# Patient Record
Sex: FEMALE | Race: WHITE | NOT HISPANIC OR LATINO | Employment: UNEMPLOYED | URBAN - METROPOLITAN AREA
[De-identification: names, ages, dates, MRNs, and addresses within clinical notes are randomized per-mention and may not be internally consistent; named-entity substitution may affect disease eponyms.]

---

## 2023-07-03 LAB
ALANINE AMINOTRANSFERASE (SGPT) (U/L) IN SER/PLAS: 10 U/L (ref 7–45)
ALBUMIN (G/DL) IN SER/PLAS: 4 G/DL (ref 3.4–5)
ALKALINE PHOSPHATASE (U/L) IN SER/PLAS: 89 U/L (ref 33–136)
ANION GAP IN SER/PLAS: 9 MMOL/L (ref 10–20)
ASPARTATE AMINOTRANSFERASE (SGOT) (U/L) IN SER/PLAS: 12 U/L (ref 9–39)
BILIRUBIN TOTAL (MG/DL) IN SER/PLAS: 0.6 MG/DL (ref 0–1.2)
CALCIUM (MG/DL) IN SER/PLAS: 9.1 MG/DL (ref 8.6–10.3)
CARBON DIOXIDE, TOTAL (MMOL/L) IN SER/PLAS: 28 MMOL/L (ref 21–32)
CHLORIDE (MMOL/L) IN SER/PLAS: 107 MMOL/L (ref 98–107)
CHOLESTEROL (MG/DL) IN SER/PLAS: 111 MG/DL (ref 0–199)
CHOLESTEROL IN HDL (MG/DL) IN SER/PLAS: 48.3 MG/DL
CHOLESTEROL/HDL RATIO: 2.3
CREATININE (MG/DL) IN SER/PLAS: 1.14 MG/DL (ref 0.5–1.05)
ESTIMATED AVERAGE GLUCOSE FOR HBA1C: 126 MG/DL
GFR FEMALE: 54 ML/MIN/1.73M2
GLUCOSE (MG/DL) IN SER/PLAS: 90 MG/DL (ref 74–99)
HEMOGLOBIN A1C/HEMOGLOBIN TOTAL IN BLOOD: 6 %
LDL: 48 MG/DL (ref 0–99)
POTASSIUM (MMOL/L) IN SER/PLAS: 4.3 MMOL/L (ref 3.5–5.3)
PROTEIN TOTAL: 6.5 G/DL (ref 6.4–8.2)
SODIUM (MMOL/L) IN SER/PLAS: 140 MMOL/L (ref 136–145)
THYROTROPIN (MIU/L) IN SER/PLAS BY DETECTION LIMIT <= 0.05 MIU/L: 3.94 MIU/L (ref 0.44–3.98)
TRIGLYCERIDE (MG/DL) IN SER/PLAS: 74 MG/DL (ref 0–149)
UREA NITROGEN (MG/DL) IN SER/PLAS: 13 MG/DL (ref 6–23)
VLDL: 15 MG/DL (ref 0–40)

## 2023-09-12 PROBLEM — E78.5 HYPERLIPIDEMIA: Status: ACTIVE | Noted: 2023-09-12

## 2023-09-12 PROBLEM — R10.9 ABDOMINAL PAIN: Status: ACTIVE | Noted: 2023-09-12

## 2023-09-12 PROBLEM — M16.11 ARTHRITIS OF RIGHT HIP: Status: ACTIVE | Noted: 2023-09-12

## 2023-09-12 PROBLEM — Z96.642 PRESENCE OF LEFT ARTIFICIAL HIP JOINT: Status: ACTIVE | Noted: 2023-09-12

## 2023-09-12 PROBLEM — M25.50 MULTIPLE JOINT PAIN: Status: ACTIVE | Noted: 2023-09-12

## 2023-09-12 PROBLEM — N20.0 NEPHROLITHIASIS: Status: ACTIVE | Noted: 2023-09-12

## 2023-09-12 PROBLEM — E05.90 HYPERTHYROIDISM: Status: ACTIVE | Noted: 2023-09-12

## 2023-09-12 PROBLEM — M24.159 LABRAL TEAR OF HIP, DEGENERATIVE: Status: ACTIVE | Noted: 2023-09-12

## 2023-09-12 PROBLEM — E78.89 LIPIDS ABNORMAL: Status: ACTIVE | Noted: 2023-09-12

## 2023-09-12 PROBLEM — E11.9 DIABETES MELLITUS (MULTI): Status: ACTIVE | Noted: 2023-09-12

## 2023-09-12 PROBLEM — I25.10 CAD (CORONARY ARTERY DISEASE): Status: ACTIVE | Noted: 2023-09-12

## 2023-09-12 PROBLEM — N63.0 BREAST LUMP IN FEMALE: Status: ACTIVE | Noted: 2023-09-12

## 2023-09-12 PROBLEM — M12.9 ARTHROPATHY: Status: ACTIVE | Noted: 2023-09-12

## 2023-09-12 PROBLEM — M25.859 FEMORAL ACETABULAR IMPINGEMENT: Status: ACTIVE | Noted: 2023-09-12

## 2023-09-12 PROBLEM — E78.9 LIPID DISORDER: Status: ACTIVE | Noted: 2023-09-12

## 2023-09-12 PROBLEM — R53.83 FATIGUE: Status: ACTIVE | Noted: 2023-09-12

## 2023-09-12 PROBLEM — D64.9 ANEMIA: Status: ACTIVE | Noted: 2023-09-12

## 2023-09-12 PROBLEM — M25.569 PAIN IN KNEE JOINT: Status: ACTIVE | Noted: 2023-09-12

## 2023-09-12 PROBLEM — M25.50 ARTHRALGIA: Status: ACTIVE | Noted: 2023-09-12

## 2023-09-12 PROBLEM — M19.90 DJD (DEGENERATIVE JOINT DISEASE): Status: ACTIVE | Noted: 2023-09-12

## 2023-09-12 PROBLEM — Z96.641 PRESENCE OF ARTIFICIAL HIP, RIGHT: Status: ACTIVE | Noted: 2023-09-12

## 2023-09-12 PROBLEM — M16.12 ARTHRITIS OF LEFT HIP: Status: ACTIVE | Noted: 2023-09-12

## 2023-09-12 PROBLEM — I35.0 AORTIC VALVE STENOSIS: Status: ACTIVE | Noted: 2023-09-12

## 2023-09-12 PROBLEM — Z96.652 PRESENCE OF LEFT ARTIFICIAL KNEE JOINT: Status: ACTIVE | Noted: 2023-09-12

## 2023-09-12 PROBLEM — E55.9 VITAMIN D DEFICIENCY: Status: ACTIVE | Noted: 2023-09-12

## 2023-09-12 PROBLEM — N18.2 KIDNEY DISEASE, CHRONIC, STAGE II (MILD, EGFR 60+ ML/MIN): Status: ACTIVE | Noted: 2023-09-12

## 2023-09-12 RX ORDER — METFORMIN HYDROCHLORIDE 500 MG/1
500 TABLET, EXTENDED RELEASE ORAL DAILY
COMMUNITY

## 2023-09-12 RX ORDER — ATORVASTATIN CALCIUM 40 MG/1
40 TABLET, FILM COATED ORAL DAILY
COMMUNITY
End: 2023-11-02 | Stop reason: CLARIF

## 2023-09-12 RX ORDER — FERROUS SULFATE, DRIED 160(50) MG
1 TABLET, EXTENDED RELEASE ORAL 2 TIMES DAILY
COMMUNITY
Start: 2021-05-05

## 2023-09-12 RX ORDER — LISINOPRIL 2.5 MG/1
2.5 TABLET ORAL DAILY
COMMUNITY
End: 2023-11-02 | Stop reason: SINTOL

## 2023-09-12 RX ORDER — LEVOTHYROXINE SODIUM 75 UG/1
TABLET ORAL
COMMUNITY
Start: 2021-05-26

## 2023-09-12 RX ORDER — ASPIRIN 81 MG/1
1 TABLET ORAL DAILY
COMMUNITY
Start: 2018-07-09

## 2023-09-12 RX ORDER — GLIMEPIRIDE 1 MG/1
1 TABLET ORAL DAILY
COMMUNITY
Start: 2021-09-23

## 2023-09-12 RX ORDER — POLYETHYLENE GLYCOL 3350 17 G/17G
17 POWDER, FOR SOLUTION ORAL 2 TIMES DAILY PRN
COMMUNITY
Start: 2021-11-18 | End: 2023-11-02 | Stop reason: WASHOUT

## 2023-09-12 RX ORDER — ACETAMINOPHEN 325 MG/1
3 TABLET ORAL EVERY 6 HOURS
COMMUNITY
Start: 2021-11-18

## 2023-09-12 RX ORDER — UBIDECARENONE 75 MG
1 CAPSULE ORAL DAILY
COMMUNITY
Start: 2020-10-16

## 2023-09-12 RX ORDER — ONDANSETRON 4 MG/1
4 TABLET, FILM COATED ORAL EVERY 8 HOURS PRN
COMMUNITY
Start: 2021-11-18 | End: 2023-11-02 | Stop reason: WASHOUT

## 2023-09-12 RX ORDER — ATORVASTATIN CALCIUM 80 MG/1
1 TABLET, FILM COATED ORAL DAILY
COMMUNITY
Start: 2018-08-27

## 2023-09-12 RX ORDER — DOCUSATE SODIUM 100 MG/1
100 CAPSULE, LIQUID FILLED ORAL 2 TIMES DAILY
COMMUNITY
Start: 2021-11-18 | End: 2023-11-02 | Stop reason: WASHOUT

## 2023-09-12 RX ORDER — ALENDRONATE SODIUM 70 MG/1
70 TABLET ORAL
COMMUNITY
Start: 2021-05-05

## 2023-09-12 RX ORDER — KETOROLAC TROMETHAMINE 10 MG/1
10 TABLET, FILM COATED ORAL EVERY 8 HOURS
COMMUNITY
Start: 2021-11-18 | End: 2023-11-02 | Stop reason: WASHOUT

## 2023-10-04 ENCOUNTER — APPOINTMENT (OUTPATIENT)
Dept: RADIOLOGY | Facility: CLINIC | Age: 63
End: 2023-10-04
Payer: COMMERCIAL

## 2023-10-05 ENCOUNTER — ANCILLARY PROCEDURE (OUTPATIENT)
Dept: RADIOLOGY | Facility: CLINIC | Age: 63
End: 2023-10-05
Payer: COMMERCIAL

## 2023-10-05 DIAGNOSIS — Z12.31 ENCOUNTER FOR SCREENING MAMMOGRAM FOR MALIGNANT NEOPLASM OF BREAST: ICD-10-CM

## 2023-10-05 PROCEDURE — 77063 BREAST TOMOSYNTHESIS BI: CPT

## 2023-10-05 PROCEDURE — 77063 BREAST TOMOSYNTHESIS BI: CPT | Mod: LIO | Performed by: RADIOLOGY

## 2023-10-05 PROCEDURE — 77067 SCR MAMMO BI INCL CAD: CPT | Mod: LIO

## 2023-10-05 PROCEDURE — 77067 SCR MAMMO BI INCL CAD: CPT | Mod: LIO | Performed by: RADIOLOGY

## 2023-10-06 PROBLEM — M77.30 CALCANEAL SPUR OF FOOT: Status: ACTIVE | Noted: 2017-03-15

## 2023-10-06 PROBLEM — M15.9 POLYOSTEOARTHRITIS, UNSPECIFIED: Status: ACTIVE | Noted: 2023-02-14

## 2023-10-06 PROBLEM — N28.9 RENAL INSUFFICIENCY: Status: ACTIVE | Noted: 2023-10-06

## 2023-10-06 PROBLEM — M54.50 LOW BACK PAIN: Status: ACTIVE | Noted: 2023-02-14

## 2023-10-06 PROBLEM — E03.9 HYPOTHYROIDISM: Status: ACTIVE | Noted: 2023-07-13

## 2023-10-06 PROBLEM — K58.9 IRRITABLE BOWEL SYNDROME WITHOUT DIARRHEA: Status: ACTIVE | Noted: 2023-02-14

## 2023-10-06 PROBLEM — M77.50 CALCANEAL BURSITIS (HEEL): Status: ACTIVE | Noted: 2017-03-15

## 2023-10-06 PROBLEM — I10 HTN (HYPERTENSION): Status: ACTIVE | Noted: 2023-07-13

## 2023-10-06 PROBLEM — Z87.440 PERSONAL HISTORY OF URINARY (TRACT) INFECTIONS: Status: ACTIVE | Noted: 2023-02-14

## 2023-10-06 PROBLEM — H66.90 OTITIS MEDIA: Status: ACTIVE | Noted: 2023-02-14

## 2023-10-06 PROBLEM — E05.91 THYROTOXICOSIS WITH THYROTOXIC CRISIS: Status: ACTIVE | Noted: 2023-02-14

## 2023-10-06 PROBLEM — N18.9 ANEMIA IN CHRONIC KIDNEY DISEASE: Status: ACTIVE | Noted: 2023-02-14

## 2023-10-06 PROBLEM — D63.1 ANEMIA IN CHRONIC KIDNEY DISEASE: Status: ACTIVE | Noted: 2023-02-14

## 2023-10-06 PROBLEM — E11.9 TYPE 2 DIABETES MELLITUS, CONTROLLED (MULTI): Status: ACTIVE | Noted: 2023-07-13

## 2023-10-06 PROBLEM — M54.2 CERVICAL SPINE PAIN: Status: ACTIVE | Noted: 2023-10-06

## 2023-10-06 PROBLEM — N20.0 CALCULUS OF KIDNEY: Status: ACTIVE | Noted: 2023-02-14

## 2023-10-06 PROBLEM — M72.2 PLANTAR FASCIITIS: Status: ACTIVE | Noted: 2017-03-15

## 2023-10-06 PROBLEM — Z79.84 LONG TERM (CURRENT) USE OF ORAL HYPOGLYCEMIC DRUGS: Status: ACTIVE | Noted: 2023-02-14

## 2023-10-06 PROBLEM — Z79.82 LONG TERM (CURRENT) USE OF ASPIRIN: Status: ACTIVE | Noted: 2023-02-14

## 2023-10-06 PROBLEM — M16.9 OSTEOARTHROSIS OF HIP: Status: ACTIVE | Noted: 2023-10-06

## 2023-10-06 PROBLEM — Z47.1 AFTERCARE FOLLOWING JOINT REPLACEMENT SURGERY: Status: ACTIVE | Noted: 2023-02-14

## 2023-10-06 PROBLEM — M81.0 OSTEOPOROSIS: Status: ACTIVE | Noted: 2023-07-13

## 2023-10-06 PROBLEM — Z71.85 VACCINE COUNSELING: Status: ACTIVE | Noted: 2023-07-13

## 2023-10-16 ENCOUNTER — HOSPITAL ENCOUNTER (OUTPATIENT)
Dept: CARDIOLOGY | Facility: CLINIC | Age: 63
Discharge: HOME | End: 2023-10-16
Payer: COMMERCIAL

## 2023-10-16 DIAGNOSIS — I35.0 NONRHEUMATIC AORTIC (VALVE) STENOSIS: ICD-10-CM

## 2023-10-16 PROCEDURE — 93306 TTE W/DOPPLER COMPLETE: CPT

## 2023-10-16 PROCEDURE — 93306 TTE W/DOPPLER COMPLETE: CPT | Performed by: INTERNAL MEDICINE

## 2023-10-18 LAB — EJECTION FRACTION APICAL 4 CHAMBER: 63.8

## 2023-10-30 ENCOUNTER — LAB (OUTPATIENT)
Dept: LAB | Facility: LAB | Age: 63
End: 2023-10-30
Payer: COMMERCIAL

## 2023-10-30 DIAGNOSIS — E11.9 TYPE 2 DIABETES MELLITUS WITHOUT COMPLICATIONS (MULTI): ICD-10-CM

## 2023-10-30 DIAGNOSIS — E78.5 HYPERLIPIDEMIA, UNSPECIFIED: Primary | ICD-10-CM

## 2023-10-30 DIAGNOSIS — M81.0 AGE-RELATED OSTEOPOROSIS WITHOUT CURRENT PATHOLOGICAL FRACTURE: ICD-10-CM

## 2023-10-30 LAB
ALBUMIN SERPL BCP-MCNC: 4.7 G/DL (ref 3.4–5)
ALP SERPL-CCNC: 75 U/L (ref 33–136)
ALT SERPL W P-5'-P-CCNC: 8 U/L (ref 7–45)
ANION GAP SERPL CALC-SCNC: 13 MMOL/L (ref 10–20)
AST SERPL W P-5'-P-CCNC: 12 U/L (ref 9–39)
BASOPHILS # BLD AUTO: 0.03 X10*3/UL (ref 0–0.1)
BASOPHILS NFR BLD AUTO: 0.6 %
BILIRUB SERPL-MCNC: 0.8 MG/DL (ref 0–1.2)
BUN SERPL-MCNC: 23 MG/DL (ref 6–23)
CALCIUM SERPL-MCNC: 9.6 MG/DL (ref 8.6–10.3)
CHLORIDE SERPL-SCNC: 105 MMOL/L (ref 98–107)
CHOLEST SERPL-MCNC: 148 MG/DL (ref 0–199)
CHOLESTEROL/HDL RATIO: 2.5
CO2 SERPL-SCNC: 26 MMOL/L (ref 21–32)
CREAT SERPL-MCNC: 1.23 MG/DL (ref 0.5–1.05)
EOSINOPHIL # BLD AUTO: 0.1 X10*3/UL (ref 0–0.7)
EOSINOPHIL NFR BLD AUTO: 1.9 %
ERYTHROCYTE [DISTWIDTH] IN BLOOD BY AUTOMATED COUNT: 13.8 % (ref 11.5–14.5)
GFR SERPL CREATININE-BSD FRML MDRD: 49 ML/MIN/1.73M*2
GLUCOSE SERPL-MCNC: 93 MG/DL (ref 74–99)
HCT VFR BLD AUTO: 38.3 % (ref 36–46)
HDLC SERPL-MCNC: 60.2 MG/DL
HGB BLD-MCNC: 12.9 G/DL (ref 12–16)
IMM GRANULOCYTES # BLD AUTO: 0.02 X10*3/UL (ref 0–0.7)
IMM GRANULOCYTES NFR BLD AUTO: 0.4 % (ref 0–0.9)
LDLC SERPL CALC-MCNC: 68 MG/DL
LYMPHOCYTES # BLD AUTO: 1.48 X10*3/UL (ref 1.2–4.8)
LYMPHOCYTES NFR BLD AUTO: 28.2 %
MCH RBC QN AUTO: 33.5 PG (ref 26–34)
MCHC RBC AUTO-ENTMCNC: 33.7 G/DL (ref 32–36)
MCV RBC AUTO: 100 FL (ref 80–100)
MONOCYTES # BLD AUTO: 0.48 X10*3/UL (ref 0.1–1)
MONOCYTES NFR BLD AUTO: 9.1 %
NEUTROPHILS # BLD AUTO: 3.14 X10*3/UL (ref 1.2–7.7)
NEUTROPHILS NFR BLD AUTO: 59.8 %
NON HDL CHOLESTEROL: 88 MG/DL (ref 0–149)
NRBC BLD-RTO: 0 /100 WBCS (ref 0–0)
PLATELET # BLD AUTO: 267 X10*3/UL (ref 150–450)
PMV BLD AUTO: 10 FL (ref 7.5–11.5)
POTASSIUM SERPL-SCNC: 4.5 MMOL/L (ref 3.5–5.3)
PROT SERPL-MCNC: 7 G/DL (ref 6.4–8.2)
RBC # BLD AUTO: 3.85 X10*6/UL (ref 4–5.2)
SODIUM SERPL-SCNC: 139 MMOL/L (ref 136–145)
TRIGL SERPL-MCNC: 97 MG/DL (ref 0–149)
TSH SERPL-ACNC: 2.21 MIU/L (ref 0.44–3.98)
VLDL: 19 MG/DL (ref 0–40)
WBC # BLD AUTO: 5.3 X10*3/UL (ref 4.4–11.3)

## 2023-10-30 PROCEDURE — 80053 COMPREHEN METABOLIC PANEL: CPT

## 2023-10-30 PROCEDURE — 36415 COLL VENOUS BLD VENIPUNCTURE: CPT

## 2023-10-30 PROCEDURE — 80061 LIPID PANEL: CPT

## 2023-10-30 PROCEDURE — 85025 COMPLETE CBC W/AUTO DIFF WBC: CPT

## 2023-10-30 PROCEDURE — 84443 ASSAY THYROID STIM HORMONE: CPT

## 2023-10-30 PROCEDURE — 83036 HEMOGLOBIN GLYCOSYLATED A1C: CPT

## 2023-10-31 LAB
EST. AVERAGE GLUCOSE BLD GHB EST-MCNC: 120 MG/DL
HBA1C MFR BLD: 5.8 %

## 2023-11-01 PROBLEM — I35.0 AORTIC VALVE STENOSIS: Chronic | Status: ACTIVE | Noted: 2023-09-12

## 2023-11-01 PROBLEM — I25.10 CAD (CORONARY ARTERY DISEASE): Chronic | Status: ACTIVE | Noted: 2023-09-12

## 2023-11-01 PROBLEM — E78.5 HYPERLIPIDEMIA: Chronic | Status: ACTIVE | Noted: 2023-09-12

## 2023-11-02 ENCOUNTER — OFFICE VISIT (OUTPATIENT)
Dept: CARDIOLOGY | Facility: CLINIC | Age: 63
End: 2023-11-02
Payer: COMMERCIAL

## 2023-11-02 VITALS
BODY MASS INDEX: 26.79 KG/M2 | HEART RATE: 56 BPM | OXYGEN SATURATION: 99 % | WEIGHT: 161 LBS | SYSTOLIC BLOOD PRESSURE: 128 MMHG | DIASTOLIC BLOOD PRESSURE: 84 MMHG

## 2023-11-02 DIAGNOSIS — I10 PRIMARY HYPERTENSION: Chronic | ICD-10-CM

## 2023-11-02 DIAGNOSIS — I25.10 CORONARY ARTERY DISEASE INVOLVING NATIVE CORONARY ARTERY OF NATIVE HEART WITHOUT ANGINA PECTORIS: Chronic | ICD-10-CM

## 2023-11-02 DIAGNOSIS — E78.2 MIXED HYPERLIPIDEMIA: Chronic | ICD-10-CM

## 2023-11-02 DIAGNOSIS — I35.0 NONRHEUMATIC AORTIC VALVE STENOSIS: Primary | Chronic | ICD-10-CM

## 2023-11-02 PROBLEM — N20.0 CALCULUS OF KIDNEY: Status: RESOLVED | Noted: 2023-02-14 | Resolved: 2023-11-02

## 2023-11-02 PROBLEM — N63.0 BREAST LUMP IN FEMALE: Status: RESOLVED | Noted: 2023-09-12 | Resolved: 2023-11-02

## 2023-11-02 PROBLEM — M16.12 ARTHRITIS OF LEFT HIP: Status: RESOLVED | Noted: 2023-09-12 | Resolved: 2023-11-02

## 2023-11-02 PROBLEM — Z79.84 LONG TERM (CURRENT) USE OF ORAL HYPOGLYCEMIC DRUGS: Status: RESOLVED | Noted: 2023-02-14 | Resolved: 2023-11-02

## 2023-11-02 PROBLEM — E11.9 TYPE 2 DIABETES MELLITUS, CONTROLLED (MULTI): Chronic | Status: ACTIVE | Noted: 2023-07-13

## 2023-11-02 PROBLEM — Z87.440 PERSONAL HISTORY OF URINARY (TRACT) INFECTIONS: Status: RESOLVED | Noted: 2023-02-14 | Resolved: 2023-11-02

## 2023-11-02 PROBLEM — M25.50 MULTIPLE JOINT PAIN: Status: RESOLVED | Noted: 2023-09-12 | Resolved: 2023-11-02

## 2023-11-02 PROBLEM — M25.569 PAIN IN KNEE JOINT: Status: RESOLVED | Noted: 2023-09-12 | Resolved: 2023-11-02

## 2023-11-02 PROBLEM — M72.2 PLANTAR FASCIITIS: Status: RESOLVED | Noted: 2017-03-15 | Resolved: 2023-11-02

## 2023-11-02 PROBLEM — Z71.85 VACCINE COUNSELING: Status: RESOLVED | Noted: 2023-07-13 | Resolved: 2023-11-02

## 2023-11-02 PROBLEM — Z47.1 AFTERCARE FOLLOWING JOINT REPLACEMENT SURGERY: Status: RESOLVED | Noted: 2023-02-14 | Resolved: 2023-11-02

## 2023-11-02 PROBLEM — M15.9 POLYOSTEOARTHRITIS, UNSPECIFIED: Status: RESOLVED | Noted: 2023-02-14 | Resolved: 2023-11-02

## 2023-11-02 PROBLEM — H66.90 OTITIS MEDIA: Status: RESOLVED | Noted: 2023-02-14 | Resolved: 2023-11-02

## 2023-11-02 PROBLEM — M16.11 ARTHRITIS OF RIGHT HIP: Status: RESOLVED | Noted: 2023-09-12 | Resolved: 2023-11-02

## 2023-11-02 PROBLEM — M25.50 ARTHRALGIA: Status: RESOLVED | Noted: 2023-09-12 | Resolved: 2023-11-02

## 2023-11-02 PROBLEM — D63.1 ANEMIA IN CHRONIC KIDNEY DISEASE: Status: RESOLVED | Noted: 2023-02-14 | Resolved: 2023-11-02

## 2023-11-02 PROBLEM — M25.859 FEMORAL ACETABULAR IMPINGEMENT: Status: RESOLVED | Noted: 2023-09-12 | Resolved: 2023-11-02

## 2023-11-02 PROBLEM — Z79.82 LONG TERM (CURRENT) USE OF ASPIRIN: Status: RESOLVED | Noted: 2023-02-14 | Resolved: 2023-11-02

## 2023-11-02 PROBLEM — Z96.641 PRESENCE OF ARTIFICIAL HIP, RIGHT: Status: RESOLVED | Noted: 2023-09-12 | Resolved: 2023-11-02

## 2023-11-02 PROBLEM — M81.0 OSTEOPOROSIS: Status: RESOLVED | Noted: 2023-07-13 | Resolved: 2023-11-02

## 2023-11-02 PROBLEM — N18.9 ANEMIA IN CHRONIC KIDNEY DISEASE: Status: RESOLVED | Noted: 2023-02-14 | Resolved: 2023-11-02

## 2023-11-02 PROBLEM — M54.50 LOW BACK PAIN: Status: RESOLVED | Noted: 2023-02-14 | Resolved: 2023-11-02

## 2023-11-02 PROBLEM — M12.9 ARTHROPATHY: Status: RESOLVED | Noted: 2023-09-12 | Resolved: 2023-11-02

## 2023-11-02 PROBLEM — M16.9 OSTEOARTHROSIS OF HIP: Status: RESOLVED | Noted: 2023-10-06 | Resolved: 2023-11-02

## 2023-11-02 PROBLEM — M77.50 CALCANEAL BURSITIS (HEEL): Status: RESOLVED | Noted: 2017-03-15 | Resolved: 2023-11-02

## 2023-11-02 PROCEDURE — 93000 ELECTROCARDIOGRAM COMPLETE: CPT | Performed by: INTERNAL MEDICINE

## 2023-11-02 PROCEDURE — 99214 OFFICE O/P EST MOD 30 MIN: CPT | Performed by: INTERNAL MEDICINE

## 2023-11-02 PROCEDURE — 3048F LDL-C <100 MG/DL: CPT | Performed by: INTERNAL MEDICINE

## 2023-11-02 PROCEDURE — 3079F DIAST BP 80-89 MM HG: CPT | Performed by: INTERNAL MEDICINE

## 2023-11-02 PROCEDURE — 3044F HG A1C LEVEL LT 7.0%: CPT | Performed by: INTERNAL MEDICINE

## 2023-11-02 PROCEDURE — 1036F TOBACCO NON-USER: CPT | Performed by: INTERNAL MEDICINE

## 2023-11-02 PROCEDURE — 3074F SYST BP LT 130 MM HG: CPT | Performed by: INTERNAL MEDICINE

## 2023-11-02 RX ORDER — EZETIMIBE 10 MG/1
10 TABLET ORAL DAILY
COMMUNITY
Start: 2023-02-04

## 2023-11-02 NOTE — PROGRESS NOTES
Referred by No ref. provider found    HPI Had her 2nd hip done. Working out in the gym and walking 3 miles/day. Off metformin.. No CP/SOB. Feeling well. Wt down 17#, more active, feeling better.     Past Medical History:  Problem List Items Addressed This Visit    None       Past Medical History:   Diagnosis Date    Aortic valve stenosis 09/12/2023    Mild-mod    CAD (coronary artery disease) 09/12/2023    Elevated CAC 67 Agatston units    Cervicalgia 12/08/2013    Cervicalgia    Encounter for general adult medical examination without abnormal findings 12/08/2013    Physical exam, routine    Heart disease, unspecified     Heart problem    Hyperlipidemia 09/12/2023    Dr. Agustin    Hypothyroidism, unspecified 12/08/2013    Hypothyroidism    Low back pain, unspecified 12/08/2013    Lumbago    Noninfective gastroenteritis and colitis, unspecified 10/30/2014    Chronic diarrhea    Other abnormalities of breathing 12/08/2013    Difficulty breathing    Other conditions influencing health status 12/08/2013    Osteoarthritis Of Multiple Sites    Other conditions influencing health status 12/08/2013    Ruptured Endometrioma    Other ill-defined heart diseases     Familial heart disease    Otitis media, unspecified, unspecified ear 12/08/2013    Otitis media    Personal history of other diseases of the digestive system 10/30/2014    History of irritable bowel syndrome    Prediabetes     Prediabetes    Unspecified condition associated with female genital organs and menstrual cycle 12/08/2013    Genital symptoms, female             Past Surgical History:  She has a past surgical history that includes Other surgical history (04/04/2022); Other surgical history (04/04/2022); and Cholecystectomy (10/30/2014).      Social History:  She has no history on file for tobacco use, alcohol use, and drug use.    Family History:  Family History   Problem Relation Name Age of Onset    Heart failure Mother      Coronary artery disease Mother       Diabetes Mother      Hyperlipidemia Mother      Hypertension Mother      Heart attack Mother      Hypertension Father      Other (parkinson disease) Father      Diabetes type II Father      Other (cardiac disorder) Other      Diabetes Other      Hypertension Other      Breast cancer Father's Sister          Allergies:  Statins-hmg-coa reductase inhibitors, Erythromycin base, Lisinopril, and Metformin    Outpatient Medications:  Current Outpatient Medications   Medication Instructions    acetaminophen (Tylenol) 325 mg tablet 3 tablets, oral, Every 6 hours, CONTINUE ROUTINELY AROUND THE CLOCK FOR NEXT 72 HOURS THEN DECREASE TO AS NEEDED FOR MILD PAIN OR TEMPERATURE    alendronate (FOSAMAX) 70 mg, oral, every Monday    aspirin 81 mg EC tablet 1 tablet, oral, Daily    atorvastatin (Lipitor) 80 mg tablet 1 tablet, oral, Daily    atorvastatin (LIPITOR) 40 mg, oral, Daily    calcium carbonate-vitamin D3 500 mg-5 mcg (200 unit) tablet oral    cyanocobalamin (Vitamin B-12) 500 mcg tablet 1 tablet, oral, Daily    docusate sodium (COLACE) 100 mg, oral, 2 times daily    glimepiride (Amaryl) 1 mg tablet 1 tablet, oral, Daily    ketorolac (TORADOL) 10 mg, oral, Every 8 hours    levothyroxine (Synthroid, Levoxyl) 75 mcg tablet oral    lisinopril 2.5 mg, oral, Daily    metFORMIN XR (GLUCOPHAGE-XR) 500 mg, oral, Daily    ondansetron (ZOFRAN) 4 mg, oral, Every 8 hours PRN    polyethylene glycol (GLYCOLAX, MIRALAX) 17 g, oral, 2 times daily PRN,  *TAKE DOSE IF YOU DO NOT HAVE A BOWEL MOVEMENT WITHIN 72 HOURS OF DISCHARGE<BR>        Last Recorded Vitals:  There were no vitals filed for this visit.    Physical Exam    Physical  Patient is alert and oriented x3.  HEENT is unremarkable mucous members are moist  Neck no JVP no bruits upstrokes are full no thyromegaly  Lungs are clear bilaterally.  No wheezing crackles or rales  Heart regular rhythm normal S1-S2 there is no S3 no murmurs are heard.  Abdomen is soft vessels are positive  nontender nondistended no organomegaly no pulsatile masses  Extremities have no edema.  Distal pulses present palpable.  Neuro is grossly nonfocal  Skin has no rashes     Last Labs:  CBC -  Lab Results   Component Value Date    WBC 5.3 10/30/2023    HGB 12.9 10/30/2023    HCT 38.3 10/30/2023     10/30/2023     10/30/2023       CMP -  Lab Results   Component Value Date    CALCIUM 9.6 10/30/2023    PROT 7.0 10/30/2023    ALBUMIN 4.7 10/30/2023    AST 12 10/30/2023    ALT 8 10/30/2023    ALKPHOS 75 10/30/2023    BILITOT 0.8 10/30/2023       LIPID PANEL -   Lab Results   Component Value Date    CHOL 148 10/30/2023    HDL 60.2 10/30/2023    CHHDL 2.5 10/30/2023    VLDL 19 10/30/2023    TRIG 97 10/30/2023    NHDL 88 10/30/2023       RENAL FUNCTION PANEL -   Lab Results   Component Value Date    K 4.5 10/30/2023       Lab Results   Component Value Date    HGBA1C 5.8 (H) 10/30/2023     Procedure  Echo 9/23/23 EF 55-60%, Mild A S    AST 11/21/22 NL EF 65%    ECHO [04/12/2021]: EF 55-60%. SD = pseudonormal filling pattern. Mild AS (AV max 3 M/sec, peak/mean gradient 35/21 mmHg, JOEL 1.6 cm2).      ECHO (10/01/2018): Normal LVF 55-60% EF. NO PE. Mild AS w/ AV max 3m/s     CAC [08/2018] = 67 (left main and LAD)    Assessment/Plan   1. CAD. She has an elevated calcium score of 65 units. She has no symptoms of chest pain or pressure. Rega nuc NL 11/2022. Doing great. Walking 3 miles/day now after hip surg. Wt down. LDL better.     2. Aortic stenosis. This has been mild since 2018. Repeat echo reveals mild A S     3. Hyperlipidemia. Her LDL last year was 83. It is now gone up to 108. This correlates with the 15 pound weight gain that she has had over the course of the year. My hope is after her hip surgery she is able to do more activity to get the weight down and to get her cholesterol down. 10./30/23 LDL 68, HDL 60. Will be followed with Dr. Agustin    RTC 1 year  EKG today       Darin Arriaga MD     Instructions and  follow up

## 2023-11-02 NOTE — PATIENT INSTRUCTIONS
1. CAD. She has an elevated calcium score of 65 units. She has no symptoms of chest pain or pressure. Rega nuc NL 11/2022. Doing great. Walking 3 miles/day now after hip surg. Wt down. LDL better.     2. Aortic stenosis. This has been mild since 2018. Repeat echo reveals mild A S     3. Hyperlipidemia. Her LDL last year was 83. It is now gone up to 108. This correlates with the 15 pound weight gain that she has had over the course of the year. My hope is after her hip surgery she is able to do more activity to get the weight down and to get her cholesterol down. 10./30/23 LDL 68, HDL 60. Will be followed with Dr. Agustin    RTC 1 year  EKG today

## 2024-01-02 ENCOUNTER — LAB (OUTPATIENT)
Dept: LAB | Facility: LAB | Age: 64
End: 2024-01-02
Payer: COMMERCIAL

## 2024-01-02 DIAGNOSIS — M81.0 AGE-RELATED OSTEOPOROSIS WITHOUT CURRENT PATHOLOGICAL FRACTURE: Primary | ICD-10-CM

## 2024-01-02 LAB
ALBUMIN SERPL BCP-MCNC: 4.5 G/DL (ref 3.4–5)
ALP SERPL-CCNC: 83 U/L (ref 33–136)
ALT SERPL W P-5'-P-CCNC: 9 U/L (ref 7–45)
ANION GAP SERPL CALC-SCNC: 12 MMOL/L (ref 10–20)
AST SERPL W P-5'-P-CCNC: 13 U/L (ref 9–39)
BILIRUB SERPL-MCNC: 0.6 MG/DL (ref 0–1.2)
BUN SERPL-MCNC: 28 MG/DL (ref 6–23)
CALCIUM SERPL-MCNC: 9.4 MG/DL (ref 8.6–10.3)
CHLORIDE SERPL-SCNC: 106 MMOL/L (ref 98–107)
CO2 SERPL-SCNC: 26 MMOL/L (ref 21–32)
CREAT SERPL-MCNC: 1.29 MG/DL (ref 0.5–1.05)
GFR SERPL CREATININE-BSD FRML MDRD: 46 ML/MIN/1.73M*2
GLUCOSE SERPL-MCNC: 81 MG/DL (ref 74–99)
POTASSIUM SERPL-SCNC: 4.6 MMOL/L (ref 3.5–5.3)
PROT SERPL-MCNC: 6.8 G/DL (ref 6.4–8.2)
SODIUM SERPL-SCNC: 139 MMOL/L (ref 136–145)

## 2024-01-02 PROCEDURE — 36415 COLL VENOUS BLD VENIPUNCTURE: CPT

## 2024-01-02 PROCEDURE — 80053 COMPREHEN METABOLIC PANEL: CPT

## 2024-01-09 PROBLEM — M81.8 OTHER OSTEOPOROSIS WITHOUT CURRENT PATHOLOGICAL FRACTURE: Status: ACTIVE | Noted: 2024-01-09

## 2024-01-09 RX ORDER — ALBUTEROL SULFATE 0.83 MG/ML
3 SOLUTION RESPIRATORY (INHALATION) AS NEEDED
Status: CANCELLED | OUTPATIENT
Start: 2024-01-10

## 2024-01-09 RX ORDER — FAMOTIDINE 10 MG/ML
20 INJECTION INTRAVENOUS ONCE AS NEEDED
Status: CANCELLED | OUTPATIENT
Start: 2024-01-10

## 2024-01-09 RX ORDER — DIPHENHYDRAMINE HYDROCHLORIDE 50 MG/ML
50 INJECTION INTRAMUSCULAR; INTRAVENOUS AS NEEDED
Status: CANCELLED | OUTPATIENT
Start: 2024-01-10

## 2024-01-09 RX ORDER — EPINEPHRINE 0.3 MG/.3ML
0.3 INJECTION SUBCUTANEOUS EVERY 5 MIN PRN
Status: CANCELLED | OUTPATIENT
Start: 2024-01-10

## 2024-01-10 ENCOUNTER — INFUSION (OUTPATIENT)
Dept: INFUSION THERAPY | Facility: CLINIC | Age: 64
End: 2024-01-10
Payer: COMMERCIAL

## 2024-01-10 VITALS
RESPIRATION RATE: 16 BRPM | TEMPERATURE: 98.6 F | DIASTOLIC BLOOD PRESSURE: 85 MMHG | HEART RATE: 64 BPM | OXYGEN SATURATION: 99 % | SYSTOLIC BLOOD PRESSURE: 138 MMHG

## 2024-01-10 DIAGNOSIS — M81.8 OTHER OSTEOPOROSIS WITHOUT CURRENT PATHOLOGICAL FRACTURE: Primary | ICD-10-CM

## 2024-01-10 PROCEDURE — 2500000004 HC RX 250 GENERAL PHARMACY W/ HCPCS (ALT 636 FOR OP/ED): Mod: JZ | Performed by: INTERNAL MEDICINE

## 2024-01-10 PROCEDURE — 96372 THER/PROPH/DIAG INJ SC/IM: CPT | Performed by: INTERNAL MEDICINE

## 2024-01-10 RX ORDER — FAMOTIDINE 10 MG/ML
20 INJECTION INTRAVENOUS ONCE AS NEEDED
OUTPATIENT
Start: 2024-07-08

## 2024-01-10 RX ORDER — EPINEPHRINE 0.3 MG/.3ML
0.3 INJECTION SUBCUTANEOUS EVERY 5 MIN PRN
OUTPATIENT
Start: 2024-07-08

## 2024-01-10 RX ORDER — DIPHENHYDRAMINE HYDROCHLORIDE 50 MG/ML
50 INJECTION INTRAMUSCULAR; INTRAVENOUS AS NEEDED
OUTPATIENT
Start: 2024-07-08

## 2024-01-10 RX ORDER — ALBUTEROL SULFATE 0.83 MG/ML
3 SOLUTION RESPIRATORY (INHALATION) AS NEEDED
OUTPATIENT
Start: 2024-07-08

## 2024-01-10 RX ADMIN — DENOSUMAB 60 MG: 60 INJECTION SUBCUTANEOUS at 15:15

## 2024-01-10 ASSESSMENT — PATIENT HEALTH QUESTIONNAIRE - PHQ9
2. FEELING DOWN, DEPRESSED OR HOPELESS: NOT AT ALL
SUM OF ALL RESPONSES TO PHQ9 QUESTIONS 1 AND 2: 0
1. LITTLE INTEREST OR PLEASURE IN DOING THINGS: NOT AT ALL

## 2024-01-10 ASSESSMENT — COLUMBIA-SUICIDE SEVERITY RATING SCALE - C-SSRS
2. HAVE YOU ACTUALLY HAD ANY THOUGHTS OF KILLING YOURSELF?: NO
1. IN THE PAST MONTH, HAVE YOU WISHED YOU WERE DEAD OR WISHED YOU COULD GO TO SLEEP AND NOT WAKE UP?: NO
6. HAVE YOU EVER DONE ANYTHING, STARTED TO DO ANYTHING, OR PREPARED TO DO ANYTHING TO END YOUR LIFE?: NO

## 2024-01-10 ASSESSMENT — ENCOUNTER SYMPTOMS
LOSS OF SENSATION IN FEET: 0
OCCASIONAL FEELINGS OF UNSTEADINESS: 0
DEPRESSION: 0

## 2024-01-10 NOTE — PROGRESS NOTES
Pt here for prolia injection, calcium 9.4 on 1/2/24. Pt does not have any dental procedures in next 4 weeks. No other contraindications at this time.

## 2024-02-19 ENCOUNTER — TELEPHONE (OUTPATIENT)
Dept: GASTROENTEROLOGY | Facility: CLINIC | Age: 64
End: 2024-02-19
Payer: COMMERCIAL

## 2024-02-19 DIAGNOSIS — Z12.11 COLON CANCER SCREENING: Primary | ICD-10-CM

## 2024-02-20 ENCOUNTER — APPOINTMENT (OUTPATIENT)
Dept: GASTROENTEROLOGY | Facility: CLINIC | Age: 64
End: 2024-02-20
Payer: COMMERCIAL

## 2024-02-20 RX ORDER — SODIUM, POTASSIUM,MAG SULFATES 17.5-3.13G
SOLUTION, RECONSTITUTED, ORAL ORAL
Qty: 354 ML | Refills: 0 | Status: SHIPPED | OUTPATIENT
Start: 2024-02-20 | End: 2024-03-14 | Stop reason: ALTCHOICE

## 2024-03-01 DIAGNOSIS — Z12.11 COLON CANCER SCREENING: Primary | ICD-10-CM

## 2024-03-01 RX ORDER — POLYETHYLENE GLYCOL 3350, SODIUM SULFATE ANHYDROUS, SODIUM BICARBONATE, SODIUM CHLORIDE, POTASSIUM CHLORIDE 236; 22.74; 6.74; 5.86; 2.97 G/4L; G/4L; G/4L; G/4L; G/4L
4000 POWDER, FOR SOLUTION ORAL ONCE
Qty: 4000 ML | Refills: 0 | Status: SHIPPED | OUTPATIENT
Start: 2024-03-01 | End: 2024-03-01

## 2024-03-14 ENCOUNTER — ANESTHESIA (OUTPATIENT)
Dept: GASTROENTEROLOGY | Facility: HOSPITAL | Age: 64
End: 2024-03-14
Payer: COMMERCIAL

## 2024-03-14 ENCOUNTER — ANESTHESIA EVENT (OUTPATIENT)
Dept: GASTROENTEROLOGY | Facility: HOSPITAL | Age: 64
End: 2024-03-14
Payer: COMMERCIAL

## 2024-03-14 ENCOUNTER — HOSPITAL ENCOUNTER (OUTPATIENT)
Dept: GASTROENTEROLOGY | Facility: HOSPITAL | Age: 64
Discharge: HOME | End: 2024-03-14
Payer: COMMERCIAL

## 2024-03-14 VITALS
HEART RATE: 65 BPM | TEMPERATURE: 96.8 F | DIASTOLIC BLOOD PRESSURE: 71 MMHG | OXYGEN SATURATION: 99 % | SYSTOLIC BLOOD PRESSURE: 136 MMHG | WEIGHT: 158 LBS | RESPIRATION RATE: 16 BRPM | BODY MASS INDEX: 26.29 KG/M2

## 2024-03-14 DIAGNOSIS — Z12.11 SPECIAL SCREENING FOR MALIGNANT NEOPLASMS, COLON: ICD-10-CM

## 2024-03-14 LAB — GLUCOSE BLD MANUAL STRIP-MCNC: 89 MG/DL (ref 74–99)

## 2024-03-14 PROCEDURE — 82947 ASSAY GLUCOSE BLOOD QUANT: CPT

## 2024-03-14 PROCEDURE — 2500000005 HC RX 250 GENERAL PHARMACY W/O HCPCS: Performed by: NURSE ANESTHETIST, CERTIFIED REGISTERED

## 2024-03-14 PROCEDURE — 88305 TISSUE EXAM BY PATHOLOGIST: CPT

## 2024-03-14 PROCEDURE — 2500000004 HC RX 250 GENERAL PHARMACY W/ HCPCS (ALT 636 FOR OP/ED): Performed by: NURSE ANESTHETIST, CERTIFIED REGISTERED

## 2024-03-14 PROCEDURE — A45378 PR COLONOSCOPY,DIAGNOSTIC: Performed by: ANESTHESIOLOGY

## 2024-03-14 PROCEDURE — 7100000010 HC PHASE TWO TIME - EACH INCREMENTAL 1 MINUTE

## 2024-03-14 PROCEDURE — 3700000002 HC GENERAL ANESTHESIA TIME - EACH INCREMENTAL 1 MINUTE

## 2024-03-14 PROCEDURE — 45385 COLONOSCOPY W/LESION REMOVAL: CPT | Performed by: STUDENT IN AN ORGANIZED HEALTH CARE EDUCATION/TRAINING PROGRAM

## 2024-03-14 PROCEDURE — A45378 PR COLONOSCOPY,DIAGNOSTIC: Performed by: NURSE ANESTHETIST, CERTIFIED REGISTERED

## 2024-03-14 PROCEDURE — 2500000004 HC RX 250 GENERAL PHARMACY W/ HCPCS (ALT 636 FOR OP/ED): Performed by: STUDENT IN AN ORGANIZED HEALTH CARE EDUCATION/TRAINING PROGRAM

## 2024-03-14 PROCEDURE — 7100000009 HC PHASE TWO TIME - INITIAL BASE CHARGE

## 2024-03-14 PROCEDURE — 3700000001 HC GENERAL ANESTHESIA TIME - INITIAL BASE CHARGE

## 2024-03-14 PROCEDURE — 88305 TISSUE EXAM BY PATHOLOGIST: CPT | Mod: TC,PARLAB | Performed by: STUDENT IN AN ORGANIZED HEALTH CARE EDUCATION/TRAINING PROGRAM

## 2024-03-14 RX ORDER — ONDANSETRON HYDROCHLORIDE 2 MG/ML
4 INJECTION, SOLUTION INTRAVENOUS ONCE AS NEEDED
Status: DISCONTINUED | OUTPATIENT
Start: 2024-03-14 | End: 2024-03-15 | Stop reason: HOSPADM

## 2024-03-14 RX ORDER — PROPOFOL 10 MG/ML
INJECTION, EMULSION INTRAVENOUS AS NEEDED
Status: DISCONTINUED | OUTPATIENT
Start: 2024-03-14 | End: 2024-03-14

## 2024-03-14 RX ORDER — SODIUM CHLORIDE, SODIUM LACTATE, POTASSIUM CHLORIDE, CALCIUM CHLORIDE 600; 310; 30; 20 MG/100ML; MG/100ML; MG/100ML; MG/100ML
20 INJECTION, SOLUTION INTRAVENOUS CONTINUOUS
Status: DISCONTINUED | OUTPATIENT
Start: 2024-03-14 | End: 2024-03-15 | Stop reason: HOSPADM

## 2024-03-14 RX ORDER — LIDOCAINE HCL/PF 100 MG/5ML
SYRINGE (ML) INTRAVENOUS AS NEEDED
Status: DISCONTINUED | OUTPATIENT
Start: 2024-03-14 | End: 2024-03-14

## 2024-03-14 RX ADMIN — PROPOFOL 100 MG: 10 INJECTION, EMULSION INTRAVENOUS at 07:40

## 2024-03-14 RX ADMIN — PROPOFOL 100 MG: 10 INJECTION, EMULSION INTRAVENOUS at 07:53

## 2024-03-14 RX ADMIN — PROPOFOL 100 MG: 10 INJECTION, EMULSION INTRAVENOUS at 07:44

## 2024-03-14 RX ADMIN — SODIUM CHLORIDE, POTASSIUM CHLORIDE, SODIUM LACTATE AND CALCIUM CHLORIDE 20 ML/HR: 600; 310; 30; 20 INJECTION, SOLUTION INTRAVENOUS at 07:21

## 2024-03-14 RX ADMIN — PROPOFOL 100 MG: 10 INJECTION, EMULSION INTRAVENOUS at 07:34

## 2024-03-14 RX ADMIN — LIDOCAINE HYDROCHLORIDE 50 MG: 20 INJECTION, SOLUTION INTRAVENOUS at 07:34

## 2024-03-14 SDOH — HEALTH STABILITY: MENTAL HEALTH: CURRENT SMOKER: 0

## 2024-03-14 ASSESSMENT — COLUMBIA-SUICIDE SEVERITY RATING SCALE - C-SSRS
6. HAVE YOU EVER DONE ANYTHING, STARTED TO DO ANYTHING, OR PREPARED TO DO ANYTHING TO END YOUR LIFE?: NO
2. HAVE YOU ACTUALLY HAD ANY THOUGHTS OF KILLING YOURSELF?: NO
1. IN THE PAST MONTH, HAVE YOU WISHED YOU WERE DEAD OR WISHED YOU COULD GO TO SLEEP AND NOT WAKE UP?: NO

## 2024-03-14 ASSESSMENT — PAIN SCALES - GENERAL
PAIN_LEVEL: 0
PAINLEVEL_OUTOF10: 0 - NO PAIN

## 2024-03-14 ASSESSMENT — PAIN - FUNCTIONAL ASSESSMENT: PAIN_FUNCTIONAL_ASSESSMENT: 0-10

## 2024-03-14 NOTE — POST-PROCEDURE NOTE
Pt is tolerating PO snack well.  Reviewed discharge instructions, educated pt  on anesthesia safety.   All pre-op belongings with the pt.

## 2024-03-14 NOTE — ANESTHESIA POSTPROCEDURE EVALUATION
Patient: Glo Watson    Procedure Summary       Date: 03/14/24 Room / Location: [facility]    Anesthesia Start: 0729 Anesthesia Stop: 0804    Procedure: COLONOSCOPY Diagnosis: Special screening for malignant neoplasms, colon    Scheduled Providers: Chester Tapia MD Responsible Provider: Savita Cardenas MD    Anesthesia Type: MAC ASA Status: 3            Anesthesia Type: MAC    Vitals Value Taken Time   /71 03/14/24 0815   Temp 36 °C (96.8 °F) 03/14/24 0758   Pulse 65 03/14/24 0815   Resp 16 03/14/24 0815   SpO2 99 % 03/14/24 0815       Anesthesia Post Evaluation    Patient location during evaluation: PACU  Patient participation: complete - patient participated  Level of consciousness: awake and alert  Pain score: 0  Pain management: adequate  Airway patency: patent  Cardiovascular status: acceptable and hemodynamically stable  Respiratory status: acceptable, spontaneous ventilation and nasal cannula  Hydration status: acceptable  Postoperative Nausea and Vomiting: none        There were no known notable events for this encounter.

## 2024-03-14 NOTE — ANESTHESIA PREPROCEDURE EVALUATION
Patient: Glo Watson    Procedure Information       Date/Time: 03/14/24 0730    Scheduled providers: Chester Tapia MD    Procedure: COLONOSCOPY    Location: [facility]            Relevant Problems   Cardiovascular   (+) Aortic valve stenosis   (+) CAD (coronary artery disease)   (+) HTN (hypertension)   (+) Hyperlipidemia      Endocrine   (+) Hypothyroidism   (+) Type 2 diabetes mellitus, controlled (CMS/HCC)      GI   (+) Irritable bowel syndrome without diarrhea      /Renal   (+) Kidney disease, chronic, stage II (mild, EGFR 60+ ml/min)   (+) Nephrolithiasis   (+) Renal insufficiency      Musculoskeletal   (+) DJD (degenerative joint disease)       Clinical information reviewed:    Allergies      OB Status           NPO Detail:  NPO/Void Status  Date of Last Liquid: 03/14/24  Time of Last Liquid: 0001  Date of Last Solid: 03/12/24         Physical Exam    Airway  Mallampati: II  TM distance: >3 FB  Neck ROM: full     Cardiovascular - normal exam     Dental - normal exam     Pulmonary - normal exam     Abdominal - normal exam             Anesthesia Plan    History of general anesthesia?: yes  History of complications of general anesthesia?: no    ASA 3     MAC     The patient is not a current smoker.    intravenous induction   Anesthetic plan and risks discussed with patient.    Plan discussed with CRNA.

## 2024-03-14 NOTE — LETTER
"  Dear  Ms Watson,    It was a pleasure to see you at [facility] for your colonoscopy. During the procedure, polyp(s) were detected.  The biopsy from the polyp(s) showed that the removed polyp(s) was \"Adenomatous\". Adenomatous polyps may progress into Colon cancer over time if they are not removed. The polyps seen during your procedure was (were) removed completely.    Biopsy of the polyp(s) did not reveal cancer.    Because we have concerns that you may develop adenomatous polyps in the future we would like to repeat the colonoscopy in 3 years.    Please keep this letter for your records and talk to your PCP regarding a referral for a repeat colonoscopy when it is due.    Thank you for the opportunity to participate in your care.     If you have any questions or concerns about the findings or my recommendations please do not hesitate to contact our office at [phone].    Best Regards,    Chester Tapia MD  "

## 2024-03-14 NOTE — DISCHARGE INSTRUCTIONS
Patient Instructions after an Endoscopy      Post-procedure recommendations:  - The patient will be observed post-procedure, until all discharge criteria are met.   - Discharge the patient to home with family member/escort.  - Resume previous diet.  - Continue present medications.  - Observe patient's clinical course following today's procedure with therapeutic intervention.   - Watch for bleeding, perforation, and infection.   - Follow-up with referring physician.   - Return to primary care physician.  - The patient has a contact number available for  emergencies.  The signs and symptoms of potential delayed complications were discussed with the patient.  Return to normal activities tomorrow.  Written discharge instructions were provided to the patient.    The anesthetics, sedatives or narcotics which were given to you today will be acting in your body for the next 24 hours, so you might feel a little sleepy or groggy.  This feeling should slowly wear off. Carefully read and follow the instructions.     You received sedation today:  - Do not drive or operate any machinery or power tools of any kind.   - No alcoholic beverages today, not even beer or wine.  - Do not make any important decisions or sign any legal documents.  - No over the counter medications that contain alcohol or that may cause drowsiness.  - Do not make any important decisions or sign any legal documents.    While it is common to experience mild to moderate abdominal distention, gas, or belching after your procedure, if any of these symptoms occur following discharge from the GI Lab or within one week of having your procedure, call the [facility] to be advised whether a visit to your nearest Urgent Care or Emergency Department is indicated.  Take this paper with you if you go:  - If you develop an allergic reaction to the medications that were given during your procedure such as difficulty breathing, rash, hives, severe nausea,  vomiting or lightheadedness.  - If you experience chest pain, shortness of breath, severe abdominal pain, fevers and chills.  - If you develop signs and symptoms of bleeding such as blood in your spit, if your stools turn black, tarry, or bloody.  - If you have not urinated within 8 hours following your procedure.  - If your IV site becomes painful, red, inflamed, or looks infected.       If you experience any problems or have any questions following discharge from the GI Lab, please call: [phone]

## 2024-03-14 NOTE — H&P
Procedure H&P    Patient Profile-Procedures  Name Glo Watson  Date of Birth 1960  MRN [id number]  Address   [address] [facility] [id number] [address] [facility] [id number]    Primary Phone Number [phone]  Secondary Phone Number    Jaycee Guadarrama    Procedure(s):  Procedures: Colonoscopy  Primary contact name and number   Extended Emergency Contact Information  Primary Emergency Contact: Jodee Watson  Home Phone: [phone]  Relation: Child    General Health  Weight   Vitals:    03/14/24 0719   Weight: 71.7 kg (158 lb)     BMI Body mass index is 26.29 kg/m².    Allergies  Allergies   Allergen Reactions    Erythromycin Base Unknown       Past Medical History   Past Medical History:   Diagnosis Date    Aortic valve stenosis 09/12/2023    Mild-mod    CAD (coronary artery disease) 09/12/2023    Elevated CAC 67 Agatston units    Cervicalgia 12/08/2013    Cervicalgia    Encounter for general adult medical examination without abnormal findings 12/08/2013    Physical exam, routine    Heart disease, unspecified     Heart problem    HTN (hypertension) 07/13/2023    Hyperlipidemia 09/12/2023    Dr. Agustin    Hypothyroidism, unspecified 12/08/2013    Hypothyroidism    Low back pain, unspecified 12/08/2013    Lumbago    Noninfective gastroenteritis and colitis, unspecified 10/30/2014    Chronic diarrhea    Other abnormalities of breathing 12/08/2013    Difficulty breathing    Other conditions influencing health status 12/08/2013    Osteoarthritis Of Multiple Sites    Other conditions influencing health status 12/08/2013    Ruptured Endometrioma    Other ill-defined heart diseases     Familial heart disease    Otitis media, unspecified, unspecified ear 12/08/2013    Otitis media    Personal history of other diseases of the digestive system 10/30/2014    History of irritable bowel syndrome    Prediabetes     Prediabetes    Type 2 diabetes mellitus, controlled (CMS/[facility]) 07/13/2023     Unspecified condition associated with female genital organs and menstrual cycle 12/08/2013    Genital symptoms, female       Provider assessment  Diagnosis: Colon Cancer Screening/Surveillance   Medication Reviewed - yes  Prior to Admission medications    Medication Sig Start Date End Date Taking? Authorizing Provider   acetaminophen (Tylenol) 325 mg tablet Take 3 tablets (975 mg) by mouth every 6 hours. CONTINUE ROUTINELY AROUND THE CLOCK FOR NEXT 72 HOURS THEN DECREASE TO AS NEEDED FOR MILD PAIN OR TEMPERATURE 11/18/21  Yes Historical Provider, MD   alendronate (Fosamax) 70 mg tablet Take 1 tablet (70 mg) by mouth. every Monday 5/5/21  Yes Historical Provider, MD   aspirin 81 mg EC tablet Take 1 tablet (81 mg) by mouth once daily. 7/9/18  Yes Historical Provider, MD   atorvastatin (Lipitor) 80 mg tablet Take 1 tablet (80 mg) by mouth once daily. 8/27/18  Yes Historical Provider, MD   calcium carbonate-vitamin D3 500 mg-5 mcg (200 unit) tablet Take 1 tablet by mouth 2 times a day. 5/5/21  Yes Historical Provider, MD   cyanocobalamin (Vitamin B-12) 500 mcg tablet Take 1 tablet (500 mcg) by mouth once daily. 10/16/20  Yes Historical Provider, MD   denosumab (Prolia) 60 mg/mL syringe Inject 1 mL (60 mg total) under the skin every 6 months.   Yes Historical Provider, MD   ezetimibe (Zetia) 10 mg tablet Take 1 tablet (10 mg) by mouth once daily. 2/4/23  Yes Historical Provider, MD   glimepiride (Amaryl) 1 mg tablet Take 1 tablet (1 mg) by mouth once daily. 9/23/21  Yes Historical Provider, MD   levothyroxine (Synthroid, Levoxyl) 75 mcg tablet Take by mouth. 5/26/21  Yes Historical Provider, MD   metFORMIN  mg 24 hr tablet Take 1 tablet (500 mg) by mouth once daily.   Yes Historical Provider, MD   sodium,potassium,mag sulfates (Suprep Bowel Prep Kit) 17.5-3.13-1.6 gram recon soln solution Take one bottle twice as directed by the prep instructions 2/20/24 3/14/24  Mick Leblanc MD       Physical Exam  Vitals:     03/14/24 0719   BP: 132/65   Pulse: 69   Resp: 18   Temp: 37.1 °C (98.8 °F)   SpO2: 99%        General: A&Ox3, NAD.  HEENT: AT/NC.   CV: RRR. No murmur.  Resp: CTA bilaterally. No wheezing, rhonchi or rales.   GI: Soft, NT/ND. BSx4.  Extrem: No edema. Pulses intact.  Skin: No Jaundice.   Neuro: No focal deficits.   Psych: Normal mood and affect.      Procedure Plan - pre-procedural (re)assesment completed by physician:  discharge/transfer patient when discharge criteria met    Chester Tapia MD  3/14/2024 7:21 AM

## 2024-03-21 LAB
LABORATORY COMMENT REPORT: NORMAL
PATH REPORT.FINAL DX SPEC: NORMAL
PATH REPORT.GROSS SPEC: NORMAL
PATH REPORT.TOTAL CANCER: NORMAL

## 2024-08-01 ENCOUNTER — LAB (OUTPATIENT)
Dept: LAB | Facility: LAB | Age: 64
End: 2024-08-01
Payer: COMMERCIAL

## 2024-08-01 DIAGNOSIS — E11.9 TYPE 2 DIABETES MELLITUS WITHOUT COMPLICATIONS (MULTI): Primary | ICD-10-CM

## 2024-08-01 DIAGNOSIS — E78.5 HYPERLIPIDEMIA, UNSPECIFIED: ICD-10-CM

## 2024-08-01 LAB
ALBUMIN SERPL BCP-MCNC: 4.5 G/DL (ref 3.4–5)
ALP SERPL-CCNC: 70 U/L (ref 33–136)
ALT SERPL W P-5'-P-CCNC: 14 U/L (ref 7–45)
ANION GAP SERPL CALC-SCNC: 13 MMOL/L (ref 10–20)
AST SERPL W P-5'-P-CCNC: 15 U/L (ref 9–39)
BASOPHILS # BLD AUTO: 0.03 X10*3/UL (ref 0–0.1)
BASOPHILS NFR BLD AUTO: 0.6 %
BILIRUB SERPL-MCNC: 0.9 MG/DL (ref 0–1.2)
BUN SERPL-MCNC: 24 MG/DL (ref 6–23)
CALCIUM SERPL-MCNC: 9.3 MG/DL (ref 8.6–10.3)
CHLORIDE SERPL-SCNC: 107 MMOL/L (ref 98–107)
CHOLEST SERPL-MCNC: 148 MG/DL (ref 0–199)
CHOLESTEROL/HDL RATIO: 2.3
CO2 SERPL-SCNC: 24 MMOL/L (ref 21–32)
CREAT SERPL-MCNC: 1.38 MG/DL (ref 0.5–1.05)
EGFRCR SERPLBLD CKD-EPI 2021: 43 ML/MIN/1.73M*2
EOSINOPHIL # BLD AUTO: 0.08 X10*3/UL (ref 0–0.7)
EOSINOPHIL NFR BLD AUTO: 1.5 %
ERYTHROCYTE [DISTWIDTH] IN BLOOD BY AUTOMATED COUNT: 13.6 % (ref 11.5–14.5)
EST. AVERAGE GLUCOSE BLD GHB EST-MCNC: 126 MG/DL
GLUCOSE SERPL-MCNC: 106 MG/DL (ref 74–99)
HBA1C MFR BLD: 6 %
HCT VFR BLD AUTO: 37.7 % (ref 36–46)
HDLC SERPL-MCNC: 63.8 MG/DL
HGB BLD-MCNC: 12.9 G/DL (ref 12–16)
IMM GRANULOCYTES # BLD AUTO: 0.01 X10*3/UL (ref 0–0.7)
IMM GRANULOCYTES NFR BLD AUTO: 0.2 % (ref 0–0.9)
LDLC SERPL CALC-MCNC: 66 MG/DL
LYMPHOCYTES # BLD AUTO: 1.37 X10*3/UL (ref 1.2–4.8)
LYMPHOCYTES NFR BLD AUTO: 26.3 %
MCH RBC QN AUTO: 32.8 PG (ref 26–34)
MCHC RBC AUTO-ENTMCNC: 34.2 G/DL (ref 32–36)
MCV RBC AUTO: 96 FL (ref 80–100)
MONOCYTES # BLD AUTO: 0.46 X10*3/UL (ref 0.1–1)
MONOCYTES NFR BLD AUTO: 8.8 %
NEUTROPHILS # BLD AUTO: 3.25 X10*3/UL (ref 1.2–7.7)
NEUTROPHILS NFR BLD AUTO: 62.6 %
NON HDL CHOLESTEROL: 84 MG/DL (ref 0–149)
NRBC BLD-RTO: 0 /100 WBCS (ref 0–0)
PLATELET # BLD AUTO: 230 X10*3/UL (ref 150–450)
POTASSIUM SERPL-SCNC: 4.1 MMOL/L (ref 3.5–5.3)
PROT SERPL-MCNC: 7 G/DL (ref 6.4–8.2)
RBC # BLD AUTO: 3.93 X10*6/UL (ref 4–5.2)
SODIUM SERPL-SCNC: 140 MMOL/L (ref 136–145)
TRIGL SERPL-MCNC: 89 MG/DL (ref 0–149)
TSH SERPL-ACNC: 6.47 MIU/L (ref 0.44–3.98)
VIT B12 SERPL-MCNC: 961 PG/ML (ref 211–911)
VLDL: 18 MG/DL (ref 0–40)
WBC # BLD AUTO: 5.2 X10*3/UL (ref 4.4–11.3)

## 2024-08-01 PROCEDURE — 83036 HEMOGLOBIN GLYCOSYLATED A1C: CPT

## 2024-08-01 PROCEDURE — 80053 COMPREHEN METABOLIC PANEL: CPT

## 2024-08-01 PROCEDURE — 80061 LIPID PANEL: CPT

## 2024-08-01 PROCEDURE — 85025 COMPLETE CBC W/AUTO DIFF WBC: CPT

## 2024-08-01 PROCEDURE — 82607 VITAMIN B-12: CPT

## 2024-08-01 PROCEDURE — 36415 COLL VENOUS BLD VENIPUNCTURE: CPT

## 2024-08-01 PROCEDURE — 84443 ASSAY THYROID STIM HORMONE: CPT

## 2024-08-05 ENCOUNTER — INFUSION (OUTPATIENT)
Dept: INFUSION THERAPY | Facility: CLINIC | Age: 64
End: 2024-08-05
Payer: COMMERCIAL

## 2024-08-05 VITALS
RESPIRATION RATE: 14 BRPM | TEMPERATURE: 97.9 F | OXYGEN SATURATION: 96 % | SYSTOLIC BLOOD PRESSURE: 120 MMHG | DIASTOLIC BLOOD PRESSURE: 62 MMHG | HEART RATE: 65 BPM

## 2024-08-05 DIAGNOSIS — M81.0 AGE-RELATED OSTEOPOROSIS WITHOUT CURRENT PATHOLOGICAL FRACTURE: ICD-10-CM

## 2024-08-05 DIAGNOSIS — M81.8 OTHER OSTEOPOROSIS WITHOUT CURRENT PATHOLOGICAL FRACTURE: Primary | ICD-10-CM

## 2024-08-05 PROCEDURE — 96372 THER/PROPH/DIAG INJ SC/IM: CPT

## 2024-08-05 PROCEDURE — 2500000004 HC RX 250 GENERAL PHARMACY W/ HCPCS (ALT 636 FOR OP/ED): Mod: JZ | Performed by: INTERNAL MEDICINE

## 2024-08-05 ASSESSMENT — ENCOUNTER SYMPTOMS
OCCASIONAL FEELINGS OF UNSTEADINESS: 0
LOSS OF SENSATION IN FEET: 0
DEPRESSION: 0

## 2024-08-05 ASSESSMENT — PAIN SCALES - GENERAL: PAINLEVEL: 0-NO PAIN

## 2024-08-05 NOTE — PROGRESS NOTES
[facility]   Infusion Clinic Note   Date: 2024   Name: Glo Watson  : 1960   MRN: [id number]         Reason for Visit: OP Infusion (prolia)      Accompanied by:Self   Visit Type:: injection   Diagnosis: Other osteoporosis without current pathological fracture    Age-related osteoporosis without current pathological fracture    Allergies:   Allergies as of 2024 - Reviewed 2024   Allergen Reaction Noted    Erythromycin base Unknown 2023      Current Meds has a current medication list which includes the following prescription(s): acetaminophen, alendronate, aspirin, atorvastatin, calcium carbonate-vitamin d3, cyanocobalamin, denosumab, ezetimibe, glimepiride, and levothyroxine, and the following Facility-Administered Medications: denosumab.        Vitals:There were no vitals filed for this visit.   Infusion Pre-procedure Checklist   Allergies reviewed: yes   Medications reviewed: no   Contraindications to treatment:No   Previous reaction to current treatment:No   Current Health Issues: None   Pain: '    Is the pain different from normal: No   Is the pain tolerable: n/a   Is your Doctor aware: n/a   Contraindications based on patient history: No   Provider notified: Not applicable   Labs: Labs reviewed   Fall Risk Screening:      Review of Systems - Oncology   Negative for complaint: [] all other systems have been reviewed and are negative for complaint   Infusion Readiness:   Assessment Concerns Related to Infusion: No  Provider notified: n/a  Assess patient for the concerns below. Document provider notification as appropriate:  - Does not meet criteria to treat Yes  - Has an active or recent infection with/without current antibiotic use No  - Has recent/planned dental work No  - Has recent/planned surgeries No  - Has recently received or plans to receive vaccinations No  - Has treatment related toxicities No  - Is pregnant (unless noted otherwise)  No    Initiated By: Chitra Talavera RN   Time: 12:35 PM     Glo Watson had no medications administered during this visit.

## 2024-08-05 NOTE — PATIENT INSTRUCTIONS
Today :We administered denosumab.     For:   1. Other osteoporosis without current pathological fracture    2. Age-related osteoporosis without current pathological fracture              Please read the  Medication Guide that was given to you and reviewed during todays visit.     (Tell all doctors including dentists that you are taking this medication)     Go to the emergency room or call 911 if:  -You have signs of allergic reaction:   -Rash, hives, itching.   -Swollen, blistered, peeling skin.   -Swelling of face, lips, mouth, tongue or throat.   -Tightness of chest, trouble breathing, swallowing or talking     Call your doctor:  - If IV / injection site gets red, warm, swollen, itchy or leaks fluid or pus.     (Leave dressing on your IV site for at least 2 hours and keep area clean and dry  - If you get sick or have symptoms of infection or are not feeling well for any reason.    (Wash your hands often, stay away from people who are sick)  - If you have side effects from your medication that do not go away or are bothersome.     (Refer to the teaching your nurse gave you for side effects to call your doctor about)    - Common side effects may include:  stuffy nose, headache, feeling tired, muscle aches, upset stomach  - Before receiving any vaccines     - Call the [facility] at   If:  - You get sick, are on antibiotics, have had a recent vaccine, have surgery or dental work and your doctor wants your visit rescheduled.  - You need to cancel and reschedule your visit for any reason. Call at least 2 days before your visit if you need to cancel.   - Your insurance changes before your next visit.    (We will need to get approval from your new insurance. This can take up to two weeks.)     The [facility] is opened Monday thru Friday. We are closed on weekends and holidays.   Voice mail will take your call if the center is closed. If you leave a message please allow 24 hours for a call  back during weekdays. If you leave a message on a weekend/holiday, we will call you back the next business day.

## 2024-09-25 ENCOUNTER — HOSPITAL ENCOUNTER (OUTPATIENT)
Dept: RADIOLOGY | Facility: HOSPITAL | Age: 64
Discharge: HOME | End: 2024-09-25
Payer: COMMERCIAL

## 2024-09-25 DIAGNOSIS — M54.2 CERVICALGIA: ICD-10-CM

## 2024-09-25 PROCEDURE — 72156 MRI NECK SPINE W/O & W/DYE: CPT

## 2024-09-25 PROCEDURE — 2550000001 HC RX 255 CONTRASTS: Performed by: INTERNAL MEDICINE

## 2024-09-25 PROCEDURE — A9575 INJ GADOTERATE MEGLUMI 0.1ML: HCPCS | Performed by: INTERNAL MEDICINE

## 2024-09-25 RX ORDER — GADOTERATE MEGLUMINE 376.9 MG/ML
14 INJECTION INTRAVENOUS
Status: COMPLETED | OUTPATIENT
Start: 2024-09-25 | End: 2024-09-25

## 2024-10-07 ENCOUNTER — APPOINTMENT (OUTPATIENT)
Dept: RADIOLOGY | Facility: HOSPITAL | Age: 64
End: 2024-10-07
Payer: COMMERCIAL

## 2024-10-07 ENCOUNTER — HOSPITAL ENCOUNTER (OUTPATIENT)
Dept: RADIOLOGY | Facility: CLINIC | Age: 64
Discharge: HOME | End: 2024-10-07
Payer: COMMERCIAL

## 2024-10-07 VITALS — BODY MASS INDEX: 26.34 KG/M2 | HEIGHT: 65 IN | WEIGHT: 158.07 LBS

## 2024-10-07 DIAGNOSIS — Z12.31 ENCOUNTER FOR SCREENING MAMMOGRAM FOR MALIGNANT NEOPLASM OF BREAST: ICD-10-CM

## 2024-10-07 PROCEDURE — 77067 SCR MAMMO BI INCL CAD: CPT | Performed by: RADIOLOGY

## 2024-10-07 PROCEDURE — 77067 SCR MAMMO BI INCL CAD: CPT

## 2024-10-07 PROCEDURE — 77063 BREAST TOMOSYNTHESIS BI: CPT | Performed by: RADIOLOGY

## 2024-11-03 PROBLEM — N28.9 RENAL INSUFFICIENCY: Status: RESOLVED | Noted: 2023-10-06 | Resolved: 2024-11-03

## 2024-11-03 PROBLEM — Z96.652 PRESENCE OF LEFT ARTIFICIAL KNEE JOINT: Status: RESOLVED | Noted: 2023-09-12 | Resolved: 2024-11-03

## 2024-11-03 PROBLEM — M77.30 CALCANEAL SPUR OF FOOT: Status: RESOLVED | Noted: 2017-03-15 | Resolved: 2024-11-03

## 2024-11-03 PROBLEM — Z96.642 PRESENCE OF LEFT ARTIFICIAL HIP JOINT: Status: RESOLVED | Noted: 2023-09-12 | Resolved: 2024-11-03

## 2024-11-03 PROBLEM — M19.90 DJD (DEGENERATIVE JOINT DISEASE): Status: RESOLVED | Noted: 2023-09-12 | Resolved: 2024-11-03

## 2024-11-03 PROBLEM — M24.159 LABRAL TEAR OF HIP, DEGENERATIVE: Status: RESOLVED | Noted: 2023-09-12 | Resolved: 2024-11-03

## 2024-11-03 PROBLEM — K58.9 IRRITABLE BOWEL SYNDROME WITHOUT DIARRHEA: Status: RESOLVED | Noted: 2023-02-14 | Resolved: 2024-11-03

## 2024-11-03 PROBLEM — M54.2 CERVICAL SPINE PAIN: Status: RESOLVED | Noted: 2023-10-06 | Resolved: 2024-11-03

## 2024-11-04 ENCOUNTER — APPOINTMENT (OUTPATIENT)
Dept: CARDIOLOGY | Facility: CLINIC | Age: 64
End: 2024-11-04
Payer: COMMERCIAL

## 2024-11-04 VITALS
OXYGEN SATURATION: 95 % | HEART RATE: 70 BPM | BODY MASS INDEX: 26.68 KG/M2 | SYSTOLIC BLOOD PRESSURE: 110 MMHG | WEIGHT: 170 LBS | DIASTOLIC BLOOD PRESSURE: 70 MMHG | HEIGHT: 67 IN

## 2024-11-04 DIAGNOSIS — I25.10 CORONARY ARTERY DISEASE INVOLVING NATIVE CORONARY ARTERY OF NATIVE HEART WITHOUT ANGINA PECTORIS: Chronic | ICD-10-CM

## 2024-11-04 DIAGNOSIS — E78.2 MIXED HYPERLIPIDEMIA: Chronic | ICD-10-CM

## 2024-11-04 DIAGNOSIS — I35.0 NONRHEUMATIC AORTIC VALVE STENOSIS: Primary | Chronic | ICD-10-CM

## 2024-11-04 DIAGNOSIS — I10 PRIMARY HYPERTENSION: Chronic | ICD-10-CM

## 2024-11-04 PROCEDURE — 3008F BODY MASS INDEX DOCD: CPT | Performed by: INTERNAL MEDICINE

## 2024-11-04 PROCEDURE — 3078F DIAST BP <80 MM HG: CPT | Performed by: INTERNAL MEDICINE

## 2024-11-04 PROCEDURE — 99213 OFFICE O/P EST LOW 20 MIN: CPT | Performed by: INTERNAL MEDICINE

## 2024-11-04 PROCEDURE — 93005 ELECTROCARDIOGRAM TRACING: CPT | Performed by: INTERNAL MEDICINE

## 2024-11-04 PROCEDURE — 3074F SYST BP LT 130 MM HG: CPT | Performed by: INTERNAL MEDICINE

## 2024-11-04 PROCEDURE — 1036F TOBACCO NON-USER: CPT | Performed by: INTERNAL MEDICINE

## 2024-11-04 PROCEDURE — 3048F LDL-C <100 MG/DL: CPT | Performed by: INTERNAL MEDICINE

## 2024-11-04 PROCEDURE — 3044F HG A1C LEVEL LT 7.0%: CPT | Performed by: INTERNAL MEDICINE

## 2024-11-04 NOTE — PROGRESS NOTES
"Referred by No ref. provider found    HPI I am seeing Glo today for follow up. She reports she is feeling \"great!\" She reports she is walking 3 miles a day. Denies CP, SOB, and palpitations. Patient has had an increase in her weight since her last visit, she reports she has been enjoying food more and had lost the weight prior for her daughters wedding being very strict in her diet.       Past Medical History:  Problem List Items Addressed This Visit    None     Past Medical History:   Diagnosis Date    Aortic valve stenosis 09/12/2023    Mild-mod    CAD (coronary artery disease) 09/12/2023    Elevated CAC 67 Agatston units    Cervicalgia 12/08/2013    Cervicalgia    Encounter for general adult medical examination without abnormal findings 12/08/2013    Physical exam, routine    Heart disease, unspecified     Heart problem    HTN (hypertension) 07/13/2023    Hyperlipidemia 09/12/2023    Dr. Agustin    Hypothyroidism, unspecified 12/08/2013    Hypothyroidism    Low back pain, unspecified 12/08/2013    Lumbago    Noninfective gastroenteritis and colitis, unspecified 10/30/2014    Chronic diarrhea    Other abnormalities of breathing 12/08/2013    Difficulty breathing    Other conditions influencing health status 12/08/2013    Osteoarthritis Of Multiple Sites    Other conditions influencing health status 12/08/2013    Ruptured Endometrioma    Other ill-defined heart diseases     Familial heart disease    Otitis media, unspecified, unspecified ear 12/08/2013    Otitis media    Personal history of other diseases of the digestive system 10/30/2014    History of irritable bowel syndrome    Prediabetes     Prediabetes    Type 2 diabetes mellitus, controlled (Multi) 07/13/2023    Unspecified condition associated with female genital organs and menstrual cycle 12/08/2013    Genital symptoms, female      Past Surgical History:  She has a past surgical history that includes Other surgical history (04/04/2022); Other surgical " history (04/04/2022); and Cholecystectomy (10/30/2014).      Social History:  She reports that she has never smoked. She has never been exposed to tobacco smoke. She has never used smokeless tobacco. No history on file for alcohol use and drug use.    Family History:  Family History   Problem Relation Name Age of Onset    Heart failure Mother      Coronary artery disease Mother      Diabetes Mother      Hyperlipidemia Mother      Hypertension Mother      Heart attack Mother      Hypertension Father      Other (parkinson disease) Father      Diabetes type II Father      Other (cardiac disorder) Other      Diabetes Other      Hypertension Other      Breast cancer Father's Sister       Allergies:  Erythromycin base    Outpatient Medications:  Current Outpatient Medications   Medication Instructions    acetaminophen (Tylenol) 325 mg tablet 3 tablets, oral, Every 6 hours, CONTINUE ROUTINELY AROUND THE CLOCK FOR NEXT 72 HOURS THEN DECREASE TO AS NEEDED FOR MILD PAIN OR TEMPERATURE    alendronate (FOSAMAX) 70 mg, oral, every Monday    aspirin 81 mg EC tablet 1 tablet, oral, Daily    atorvastatin (Lipitor) 80 mg tablet 1 tablet, oral, Daily    calcium carbonate-vitamin D3 500 mg-5 mcg (200 unit) tablet 1 tablet, oral, 2 times daily    cyanocobalamin (Vitamin B-12) 500 mcg tablet 1 tablet, oral, Daily    denosumab (PROLIA) 60 mg, subcutaneous, Every 6 months    ezetimibe (ZETIA) 10 mg, oral, Daily    glimepiride (Amaryl) 1 mg tablet 1 tablet, oral, Daily    levothyroxine (Synthroid, Levoxyl) 75 mcg tablet oral     Last Recorded Vitals:  There were no vitals filed for this visit.    Physical Exam  Patient is alert and oriented x3.  HEENT is unremarkable mucous members are moist  Neck no JVP no bruits upstrokes are full no thyromegaly  Lungs are clear bilaterally.  No wheezing crackles or rales  Heart regular rhythm normal S1-S2 there is no S3 2/6 A S murmur  Abdomen is soft bs are positive nontender nondistended no  organomegaly no pulsatile masses  Extremities have no edema.  Distal pulses present palpable.  Neuro is grossly nonfocal  Skin has no rashes     Last Labs:  CBC -  Lab Results   Component Value Date    WBC 5.2 08/01/2024    HGB 12.9 08/01/2024    HCT 37.7 08/01/2024    MCV 96 08/01/2024     08/01/2024     CMP -  Lab Results   Component Value Date    CALCIUM 9.3 08/01/2024    PROT 7.0 08/01/2024    ALBUMIN 4.5 08/01/2024    AST 15 08/01/2024    ALT 14 08/01/2024    ALKPHOS 70 08/01/2024    BILITOT 0.9 08/01/2024     LIPID PANEL -   Lab Results   Component Value Date    CHOL 148 08/01/2024    HDL 63.8 08/01/2024    CHHDL 2.3 08/01/2024    VLDL 18 08/01/2024    TRIG 89 08/01/2024    NHDL 84 08/01/2024     RENAL FUNCTION PANEL -   Lab Results   Component Value Date    K 4.1 08/01/2024     Lab Results   Component Value Date    HGBA1C 6.0 (H) 08/01/2024     Procedure  Echo 9/23/23 EF 55-60%, Mild A S    AST 11/21/22 NL EF 65%    ECHO [04/12/2021]: EF 55-60%. SD = pseudonormal filling pattern. Mild AS (AV max 3 M/sec, peak/mean gradient 35/21 mmHg, JOEL 1.6 cm2).      ECHO (10/01/2018): Normal LVF 55-60% EF. NO PE. Mild AS w/ AV max 3m/s     CAC [08/2018] = 67 (left main and LAD)    Assessment/Plan   1. CAD. She has an elevated calcium score of 65 units. She has no symptoms of chest pain or pressure. Rega nuc NL 11/2022. Doing great. Walking 3 miles/day now after hip surg. Weight up since last visit about 10lbs. Dietary changes and increase in daily activity discussed.      2. Aortic stenosis. This has been mild since 2018. Repeat echo reveals mild AS. 2D Echo next year prior to your visit.      3. Hyperlipidemia 8/1/24 LDL 66, HDL 64, Trig 89. Goal LDL 55 or below. Discussed lifestyle modifications and dietary changes. She will obtain her labs with her PCP.      Follow up with me in 1 year, 2D echo prior to next visit.    Darin Arriaga MD     Instructions and follow up

## 2024-11-04 NOTE — PATIENT INSTRUCTIONS
1. CAD. She has an elevated calcium score of 65 units. She has no symptoms of chest pain or pressure. Rega nuc NL 11/2022. Doing great. Walking 3 miles/day now after hip surg. Weight up since last visit about 10lbs. Dietary changes and increase in daily activity discussed.      2. Aortic stenosis. This has been mild since 2018. Repeat echo reveals mild AS. 2D Echo next year prior to your visit.      3. Hyperlipidemia 8/1/24 LDL 66, HDL 64, Trig 89. Goal LDL 55 or below. Discussed lifestyle modifications and dietary changes. She will obtain her labs with her PCP.      Follow up with me in 1 year, 2D echo prior to next visit.

## 2024-11-05 LAB
ATRIAL RATE: 76 BPM
P AXIS: -20 DEGREES
P OFFSET: 210 MS
P ONSET: 154 MS
PR INTERVAL: 132 MS
Q ONSET: 220 MS
QRS COUNT: 12 BEATS
QRS DURATION: 90 MS
QT INTERVAL: 386 MS
QTC CALCULATION(BAZETT): 434 MS
QTC FREDERICIA: 417 MS
R AXIS: 81 DEGREES
T AXIS: 51 DEGREES
T OFFSET: 413 MS
VENTRICULAR RATE: 76 BPM

## 2024-11-12 ENCOUNTER — LAB (OUTPATIENT)
Dept: LAB | Facility: LAB | Age: 64
End: 2024-11-12
Payer: MEDICARE

## 2024-11-12 DIAGNOSIS — N18.9 CHRONIC KIDNEY DISEASE, UNSPECIFIED: ICD-10-CM

## 2024-11-12 DIAGNOSIS — M54.2 CERVICALGIA: ICD-10-CM

## 2024-11-12 DIAGNOSIS — E03.9 HYPOTHYROIDISM, UNSPECIFIED: ICD-10-CM

## 2024-11-12 DIAGNOSIS — E11.9 TYPE 2 DIABETES MELLITUS WITHOUT COMPLICATIONS (MULTI): Primary | ICD-10-CM

## 2024-11-12 LAB
25(OH)D3 SERPL-MCNC: 36 NG/ML (ref 30–100)
ALBUMIN SERPL BCP-MCNC: 4.4 G/DL (ref 3.4–5)
ALP SERPL-CCNC: 58 U/L (ref 33–136)
ALT SERPL W P-5'-P-CCNC: 16 U/L (ref 7–45)
ANION GAP SERPL CALC-SCNC: 11 MMOL/L (ref 10–20)
AST SERPL W P-5'-P-CCNC: 18 U/L (ref 9–39)
BASOPHILS # BLD AUTO: 0.03 X10*3/UL (ref 0–0.1)
BASOPHILS NFR BLD AUTO: 0.6 %
BILIRUB SERPL-MCNC: 1 MG/DL (ref 0–1.2)
BUN SERPL-MCNC: 25 MG/DL (ref 6–23)
CALCIUM SERPL-MCNC: 10 MG/DL (ref 8.6–10.3)
CHLORIDE SERPL-SCNC: 103 MMOL/L (ref 98–107)
CHOLEST SERPL-MCNC: 145 MG/DL (ref 0–199)
CHOLESTEROL/HDL RATIO: 2.6
CO2 SERPL-SCNC: 29 MMOL/L (ref 21–32)
CREAT SERPL-MCNC: 1.41 MG/DL (ref 0.5–1.05)
EGFRCR SERPLBLD CKD-EPI 2021: 42 ML/MIN/1.73M*2
EOSINOPHIL # BLD AUTO: 0.07 X10*3/UL (ref 0–0.7)
EOSINOPHIL NFR BLD AUTO: 1.5 %
ERYTHROCYTE [DISTWIDTH] IN BLOOD BY AUTOMATED COUNT: 13.2 % (ref 11.5–14.5)
EST. AVERAGE GLUCOSE BLD GHB EST-MCNC: 131 MG/DL
GLUCOSE SERPL-MCNC: 96 MG/DL (ref 74–99)
HBA1C MFR BLD: 6.2 %
HCT VFR BLD AUTO: 40.8 % (ref 36–46)
HDLC SERPL-MCNC: 56.5 MG/DL
HGB BLD-MCNC: 13.6 G/DL (ref 12–16)
IMM GRANULOCYTES # BLD AUTO: 0.02 X10*3/UL (ref 0–0.7)
IMM GRANULOCYTES NFR BLD AUTO: 0.4 % (ref 0–0.9)
LDLC SERPL CALC-MCNC: 74 MG/DL
LYMPHOCYTES # BLD AUTO: 1.36 X10*3/UL (ref 1.2–4.8)
LYMPHOCYTES NFR BLD AUTO: 28.6 %
MCH RBC QN AUTO: 31.6 PG (ref 26–34)
MCHC RBC AUTO-ENTMCNC: 33.3 G/DL (ref 32–36)
MCV RBC AUTO: 95 FL (ref 80–100)
MONOCYTES # BLD AUTO: 0.6 X10*3/UL (ref 0.1–1)
MONOCYTES NFR BLD AUTO: 12.6 %
NEUTROPHILS # BLD AUTO: 2.68 X10*3/UL (ref 1.2–7.7)
NEUTROPHILS NFR BLD AUTO: 56.3 %
NON HDL CHOLESTEROL: 89 MG/DL (ref 0–149)
NRBC BLD-RTO: 0 /100 WBCS (ref 0–0)
PLATELET # BLD AUTO: 243 X10*3/UL (ref 150–450)
POTASSIUM SERPL-SCNC: 4.2 MMOL/L (ref 3.5–5.3)
PROT SERPL-MCNC: 6.7 G/DL (ref 6.4–8.2)
RBC # BLD AUTO: 4.3 X10*6/UL (ref 4–5.2)
SODIUM SERPL-SCNC: 139 MMOL/L (ref 136–145)
TRIGL SERPL-MCNC: 75 MG/DL (ref 0–149)
TSH SERPL-ACNC: 1.21 MIU/L (ref 0.44–3.98)
VIT B12 SERPL-MCNC: 1235 PG/ML (ref 211–911)
VLDL: 15 MG/DL (ref 0–40)
WBC # BLD AUTO: 4.8 X10*3/UL (ref 4.4–11.3)

## 2024-11-12 PROCEDURE — 82306 VITAMIN D 25 HYDROXY: CPT

## 2024-11-12 PROCEDURE — 83036 HEMOGLOBIN GLYCOSYLATED A1C: CPT

## 2024-11-12 PROCEDURE — 85025 COMPLETE CBC W/AUTO DIFF WBC: CPT

## 2024-11-12 PROCEDURE — 36415 COLL VENOUS BLD VENIPUNCTURE: CPT

## 2024-11-12 PROCEDURE — 84443 ASSAY THYROID STIM HORMONE: CPT

## 2024-11-12 PROCEDURE — 80061 LIPID PANEL: CPT

## 2024-11-12 PROCEDURE — 80053 COMPREHEN METABOLIC PANEL: CPT

## 2024-11-12 PROCEDURE — 82607 VITAMIN B-12: CPT

## 2025-02-03 ENCOUNTER — APPOINTMENT (OUTPATIENT)
Dept: INFUSION THERAPY | Facility: CLINIC | Age: 65
End: 2025-02-03
Payer: MEDICARE

## 2025-02-03 DIAGNOSIS — M81.8 OTHER OSTEOPOROSIS WITHOUT CURRENT PATHOLOGICAL FRACTURE: Primary | ICD-10-CM

## 2025-03-13 ENCOUNTER — INFUSION (OUTPATIENT)
Dept: INFUSION THERAPY | Facility: CLINIC | Age: 65
End: 2025-03-13
Payer: MEDICARE

## 2025-03-13 DIAGNOSIS — M81.0 AGE-RELATED OSTEOPOROSIS WITHOUT CURRENT PATHOLOGICAL FRACTURE: Primary | ICD-10-CM

## 2025-03-13 PROCEDURE — 2500000004 HC RX 250 GENERAL PHARMACY W/ HCPCS (ALT 636 FOR OP/ED): Mod: JZ | Performed by: INTERNAL MEDICINE

## 2025-03-13 PROCEDURE — 96372 THER/PROPH/DIAG INJ SC/IM: CPT | Mod: INF

## 2025-03-13 RX ADMIN — DENOSUMAB 60 MG: 60 INJECTION SUBCUTANEOUS at 13:38

## 2025-03-13 ASSESSMENT — ENCOUNTER SYMPTOMS
LOSS OF SENSATION IN FEET: 0
OCCASIONAL FEELINGS OF UNSTEADINESS: 1
DEPRESSION: 0

## 2025-03-13 NOTE — PROGRESS NOTES
[facility]   Infusion Clinic Note   Date: 2025   Name: Glo Felizsarah beth  : 1960   MRN: [id number]         Reason for Visit: OP Infusion (prolia)      Accompanied by:Self   Visit Type:: injection   Diagnosis: Age-related osteoporosis without current pathological fracture    Allergies:   Allergies as of 2025 - Reviewed 2024   Allergen Reaction Noted    Erythromycin base Unknown 2023      Current Meds has a current medication list which includes the following prescription(s): acetaminophen, aspirin, atorvastatin, calcium carbonate-vitamin d3, cyanocobalamin, denosumab, ezetimibe, glimepiride, and levothyroxine, and the following Facility-Administered Medications: denosumab.        Vitals:There were no vitals filed for this visit.   Infusion Pre-procedure Checklist   Allergies reviewed: yes   Medications reviewed: yes   Contraindications to treatment:No   Previous reaction to current treatment:No   Current Health Issues: None   Pain: '    Is the pain different from normal: No   Is the pain tolerable: n/a   Is your Doctor aware: n/a   Contraindications based on patient history: No   Provider notified: Not applicable   Labs: Labs reviewed   Fall Risk Screening:      Review of Systems - Oncology   Negative for complaint: [] all other systems have been reviewed and are negative for complaint   Infusion Readiness:   Assessment Concerns Related to Infusion: No  Provider notified: n/a  Assess patient for the concerns below. Document provider notification as appropriate:  - Does not meet criteria to treat N/A  - Has an active or recent infection with/without current antibiotic use No  - Has recent/planned dental work No  - Has recent/planned surgeries No  - Has recently received or plans to receive vaccinations No  - Has treatment related toxicities N/A  - Is pregnant (unless noted otherwise) N/A    Initiated By: IJTENDRA FITZGERALD RN   Time: 1:31 PM     Glo PENN  Shirley had no medications administered during this visit.

## 2025-07-07 ENCOUNTER — HOSPITAL ENCOUNTER (OUTPATIENT)
Dept: RADIOLOGY | Facility: HOSPITAL | Age: 65
Discharge: HOME | End: 2025-07-07
Payer: MEDICARE

## 2025-07-07 DIAGNOSIS — M54.50 LOW BACK PAIN, UNSPECIFIED: ICD-10-CM

## 2025-07-07 PROCEDURE — 72148 MRI LUMBAR SPINE W/O DYE: CPT | Performed by: RADIOLOGY

## 2025-07-07 PROCEDURE — 72148 MRI LUMBAR SPINE W/O DYE: CPT
